# Patient Record
(demographics unavailable — no encounter records)

---

## 2025-06-18 NOTE — ASSESSMENT
[FreeTextEntry1] : 1) frequency We have explained to the patient the natural history and multifactorial etiology of interstitial cystitis. Treatments have been discussed including dietary measures and avoiding bladder irritants, timed voiding, physical therapy, oral medications, bladder instillations. Other options for treatment include hydrodistension, fulgarization of ulcers, and sacral neuromodulation. Will refer for PT. Pamphlet given.  2) urinary hesitancy Likely secondary to dysfunctional voiding. Please see above.   Plan followup with Dr. Rebollar in 3 months refer for PT UaUCx (cathed)

## 2025-06-18 NOTE — HISTORY OF PRESENT ILLNESS
[FreeTextEntry1] : Patient of Dr. Rebollar - seen for recurrent UTI PCP- Dr. Maged Alonso  The patient is here with the following issues: 1) frequency 2) urinary hesitancy  Patient is a college graduate. She has a history of IBS and Lester's esophagus.  Has had symptoms of UTI for past 2 years, receives antibiotics but culture is usually negative  She was last seen at the beginning of this morning for annual physical, had hematuria in urine at that time but negative for UTI   Symptoms of UTI are usually immense pain in lower abdomen with fullness  She takes cranberry pills but not on a consistent basis  No positive cultures have been recorded.   The patient voids 8 times per day and 1-2 times at night. Drinks about 96 oz of fluid per day including water but this varies, and 1 cup of coffee  Denies urgency and denies urgency incontinence. The patient denies stress urinary incontinence. Denies decreased stream, but has hesitancy, intermittency and straining at times  The patient complains of incomplete bladder emptying. Has recurrent UTIs and denies hematuria. Denies vaginal bulge  3-4 bowel movements per week only when taking Miralax, otherwise will go a full week without having a bowel movement. Bowel movements are soft & normal only if taking Miralax, but otherwise struggles with constipation & hard stools. Patient has IBS & small colon, sees Dr. Jimmie Baez  The patient denies fecal incontinence. The patient does denies wearing pads.  The patient denies bladder pain.   Post void residual done today with US- 0mL  PREVIOUS HISTORY/DATA REVIEW 25- UCx negative ; Cr 0.73  25- Presented to [facility] for abdominal pain, CT of abdomen & pelvis -BLADDER: Circumferential wall thickening, which may be due to inadequate distention- suggestive of UTI  24 - visit with Dr. Rebollar - frequency q30 minutes, should call the office if she feels she has a UTI, will work on IBS symptoms and gather records, Ua 3 wbc 0 rbc, UCx no growth  OBGYN HISTORY : 0 Parity: 0 Vaginal deliveries: 0  sections:0  Currently sexually active: Yes, multiple partners, no correlation between UTI symptoms and sexual activity  Sexual dysfunction: No Postmenopausal: No Completed childbearing: No Contraception: Yes- Slynd ( Progestin based)  Hormone Replacement Therapy: No Vaginal estrogen: no  Hysterectomy: No  Most recent Pap date: 2024 History of Abnormal Pap: Yes, HPV+ , following up this August for next pap smear with GYN

## 2025-06-18 NOTE — HISTORY OF PRESENT ILLNESS
[FreeTextEntry1] : Patient of Dr. Rebollar - seen for recurrent UTI PCP- Dr. Maged Alonso  The patient is here with the following issues: 1) frequency 2) urinary hesitancy  Patient is a college graduate. She has a history of IBS and Lester's esophagus.  Has had symptoms of UTI for past 2 years, receives antibiotics but culture is usually negative  She was last seen at the beginning of this morning for annual physical, had hematuria in urine at that time but negative for UTI   Symptoms of UTI are usually immense pain in lower abdomen with fullness  She takes cranberry pills but not on a consistent basis  No positive cultures have been recorded.   The patient voids 8 times per day and 1-2 times at night. Drinks about 96 oz of fluid per day including water but this varies, and 1 cup of coffee  Denies urgency and denies urgency incontinence. The patient denies stress urinary incontinence. Denies decreased stream, but has hesitancy, intermittency and straining at times  The patient complains of incomplete bladder emptying. Has recurrent UTIs and denies hematuria. Denies vaginal bulge  3-4 bowel movements per week only when taking Miralax, otherwise will go a full week without having a bowel movement. Bowel movements are soft & normal only if taking Miralax, but otherwise struggles with constipation & hard stools. Patient has IBS & small colon, sees Dr. Jimmie Baez  The patient denies fecal incontinence. The patient does denies wearing pads.  The patient denies bladder pain.   Post void residual done today with US- 0mL  PREVIOUS HISTORY/DATA REVIEW 25- UCx negative ; Cr 0.73  25- Presented to [facility] for abdominal pain, CT of abdomen & pelvis -BLADDER: Circumferential wall thickening, which may be due to inadequate distention- suggestive of UTI  24 - visit with Dr. Rebollar - frequency q30 minutes, should call the office if she feels she has a UTI, will work on IBS symptoms and gather records, Ua 3 wbc 0 rbc, UCx no growth  OBGYN HISTORY : 0 Parity: 0 Vaginal deliveries: 0  sections:0  Currently sexually active: Yes, multiple partners, no correlation between UTI symptoms and sexual activity  Sexual dysfunction: No Postmenopausal: No Completed childbearing: No Contraception: Yes- Slynd ( Progestin based)  Hormone Replacement Therapy: No Vaginal estrogen: no  Hysterectomy: No  Most recent Pap date: 2024 History of Abnormal Pap: Yes, HPV+ , following up this August for next pap smear with GYN

## 2025-06-18 NOTE — PHYSICAL EXAM
[Normal Appearance] : normal appearance [Well Groomed] : well groomed [General Appearance - In No Acute Distress] : no acute distress [Edema] : no peripheral edema [Respiration, Rhythm And Depth] : normal respiratory rhythm and effort [Exaggerated Use Of Accessory Muscles For Inspiration] : no accessory muscle use [Abdomen Soft] : soft [Abdomen Tenderness] : non-tender [Costovertebral Angle Tenderness] : no ~M costovertebral angle tenderness [Normal Station and Gait] : the gait and station were normal for the patient's age [] : no rash [No Focal Deficits] : no focal deficits [Oriented To Time, Place, And Person] : oriented to person, place, and time [Affect] : the affect was normal [Mood] : the mood was normal [No Palpable Adenopathy] : no palpable adenopathy [FreeTextEntry2] : Abril Carl NP